# Patient Record
Sex: MALE | Race: WHITE | Employment: OTHER | URBAN - METROPOLITAN AREA
[De-identification: names, ages, dates, MRNs, and addresses within clinical notes are randomized per-mention and may not be internally consistent; named-entity substitution may affect disease eponyms.]

---

## 2021-09-10 ENCOUNTER — HOSPITAL ENCOUNTER (EMERGENCY)
Age: 31
Discharge: ANOTHER HOSPITAL | End: 2021-09-10
Attending: EMERGENCY MEDICINE
Payer: COMMERCIAL

## 2021-09-10 VITALS
RESPIRATION RATE: 18 BRPM | SYSTOLIC BLOOD PRESSURE: 109 MMHG | HEIGHT: 71 IN | OXYGEN SATURATION: 97 % | WEIGHT: 150 LBS | DIASTOLIC BLOOD PRESSURE: 70 MMHG | TEMPERATURE: 98.2 F | HEART RATE: 91 BPM | BODY MASS INDEX: 21 KG/M2

## 2021-09-10 DIAGNOSIS — T78.2XXA ANAPHYLAXIS, INITIAL ENCOUNTER: Primary | ICD-10-CM

## 2021-09-10 DIAGNOSIS — T63.481A INSECT STINGS, ACCIDENTAL OR UNINTENTIONAL, INITIAL ENCOUNTER: ICD-10-CM

## 2021-09-10 PROCEDURE — 74011250636 HC RX REV CODE- 250/636: Performed by: EMERGENCY MEDICINE

## 2021-09-10 PROCEDURE — 96374 THER/PROPH/DIAG INJ IV PUSH: CPT

## 2021-09-10 PROCEDURE — 96375 TX/PRO/DX INJ NEW DRUG ADDON: CPT

## 2021-09-10 PROCEDURE — 99284 EMERGENCY DEPT VISIT MOD MDM: CPT

## 2021-09-10 PROCEDURE — 96372 THER/PROPH/DIAG INJ SC/IM: CPT

## 2021-09-10 RX ORDER — DIPHENHYDRAMINE HCL 25 MG
25 CAPSULE ORAL
Qty: 30 CAPSULE | Refills: 0 | Status: SHIPPED | OUTPATIENT
Start: 2021-09-10 | End: 2021-09-20

## 2021-09-10 RX ORDER — EPINEPHRINE 1 MG/ML
0.3 INJECTION, SOLUTION, CONCENTRATE INTRAVENOUS
Status: COMPLETED | OUTPATIENT
Start: 2021-09-10 | End: 2021-09-10

## 2021-09-10 RX ORDER — EPINEPHRINE 0.3 MG/.3ML
0.3 INJECTION SUBCUTANEOUS
Qty: 1 EACH | Refills: 0 | Status: SHIPPED | OUTPATIENT
Start: 2021-09-10 | End: 2021-09-10

## 2021-09-10 RX ORDER — PREDNISONE 20 MG/1
40 TABLET ORAL
Qty: 10 TABLET | Refills: 0 | Status: SHIPPED | OUTPATIENT
Start: 2021-09-10 | End: 2021-09-15

## 2021-09-10 RX ADMIN — EPINEPHRINE 0.3 MG: 1 INJECTION, SOLUTION, CONCENTRATE INTRAVENOUS at 21:25

## 2021-09-10 RX ADMIN — FAMOTIDINE 20 MG: 10 INJECTION, SOLUTION INTRAVENOUS at 21:31

## 2021-09-10 RX ADMIN — METHYLPREDNISOLONE SODIUM SUCCINATE 60 MG: 125 INJECTION, POWDER, FOR SOLUTION INTRAMUSCULAR; INTRAVENOUS at 21:28

## 2021-09-11 NOTE — ED TRIAGE NOTES
Pt arrives via ems with complaint of bee sting and facial swelling. Pt took 25mg of benadryl and ems gave 50mg additional for wornsening swelling. The pt reports that he feels better at this time.

## 2021-09-11 NOTE — ED NOTES
The pt is discharged home with follow-up instructions. The pt received 3 prescriptions and verbalizes understanding of the discharge instructions.

## 2021-09-11 NOTE — ED NOTES
Patient resting quietly on stretcher with significant other at bedside. Ambulated to BR without difficulty. Will continue to monitor.

## 2021-09-11 NOTE — ED PROVIDER NOTES
ED Triage Notes  Pt arrives via ems with complaint of bee sting and facial swelling. Pt took 25mg of benadryl and ems gave 50mg additional for wornsening swelling. The pt reports that he feels better at this time. 68-year-old male brought in by EMS for allergic reaction. Patient reports that he was stung by a bee or some kind of insect when he started getting swelling of the face and difficulty swallowing. No previous history of allergic reactions. Patient took 25 mg of Benadryl and then called EMS EMS gave additional 50 mg IV Benadryl due to worsening symptoms. Patient reports mild nausea without vomiting. No abdominal pain no diarrhea. Reports some rash but more abdominally some facial swelling. Patient denies shortness of breath. Denies any tongue swelling. Patient does not take any other medications. No past medical history on file. Past Surgical History:   Procedure Laterality Date    HX OTHER SURGICAL      tubes in ears         No family history on file. Social History     Socioeconomic History    Marital status: SINGLE     Spouse name: Not on file    Number of children: Not on file    Years of education: Not on file    Highest education level: Not on file   Occupational History    Not on file   Tobacco Use    Smoking status: Current Every Day Smoker     Packs/day: 0.75     Years: 9.00     Pack years: 6.75   Substance and Sexual Activity    Alcohol use:  Yes     Alcohol/week: 2.5 standard drinks     Types: 3 Cans of beer per week     Comment: few beers a day    Drug use: Yes     Types: Marijuana    Sexual activity: Not on file   Other Topics Concern    Not on file   Social History Narrative    Not on file     Social Determinants of Health     Financial Resource Strain:     Difficulty of Paying Living Expenses:    Food Insecurity:     Worried About Running Out of Food in the Last Year:     [address] in the Last Year:    Transportation Needs:     Lack of Transportation (Medical):  Lack of Transportation (Non-Medical):    Physical Activity:     Days of Exercise per Week:     Minutes of Exercise per Session:    Stress:     Feeling of Stress :    Social Connections:     Frequency of Communication with Friends and Family:     Frequency of Social Gatherings with Friends and Family:     Attends Sabian[employer] Services:     Active Member of Clubs or Organizations:     Attends Club or Organization Meetings:     Marital Status:    Intimate Partner Violence:     Fear of Current or Ex-Partner:     Emotionally Abused:     Physically Abused:     Sexually Abused: ALLERGIES: Patient has no known allergies. Review of Systems   Constitutional: Negative for chills and fever. HENT: Positive for facial swelling, sore throat and trouble swallowing. Negative for congestion, tinnitus and voice change. Eyes: Negative for pain. Respiratory: Positive for cough and choking. Negative for shortness of breath. Cardiovascular: Negative for chest pain. Gastrointestinal: Negative for abdominal pain, diarrhea, nausea and vomiting. Genitourinary: Negative for dysuria and flank pain. Musculoskeletal: Negative for back pain and neck pain. Skin: Positive for rash. Neurological: Negative for dizziness and headaches. Vitals:    09/10/21 2142 09/10/21 2145 09/10/21 2215 09/10/21 2300   BP:  130/80 118/72 109/70   Pulse:       Resp:       Temp:       SpO2: 98% 97% 96% 97%   Weight:       Height:                Physical Exam  Constitutional:       Appearance: He is well-developed. HENT:      Head: Normocephalic. Mouth/Throat:      Lips: Pink. Mouth: Angioedema present. Tongue: No lesions. Pharynx: Pharyngeal swelling (mild) present. Comments: No tongue swelling. Mild lip and face swelling    Eyes:      Conjunctiva/sclera: Conjunctivae normal.   Cardiovascular:      Rate and Rhythm: Normal rate and regular rhythm.    Pulmonary: Effort: Pulmonary effort is normal. Prolonged expiration present. No respiratory distress. Breath sounds: Normal breath sounds. No stridor. No wheezing. Abdominal:      General: Bowel sounds are normal.      Palpations: Abdomen is soft. Tenderness: There is no abdominal tenderness. Musculoskeletal:         General: Normal range of motion. Cervical back: Normal range of motion and neck supple. Skin:     General: Skin is warm. Capillary Refill: Capillary refill takes less than 2 seconds. Findings: Rash present. Rash is urticarial.   Neurological:      Mental Status: He is alert and oriented to person, place, and time. Comments: No gross motor or sensory deficits          MDM  Number of Diagnoses or Management Options  Anaphylaxis, initial encounter  Insect stings, accidental or unintentional, initial encounter  Diagnosis management comments: Patient having symptoms consistent with anaphylaxis due to cutaneous urticaria and posterior oropharynx edema. No stridor or wheezing. Mild nausea but no diarrhea or abdominal pain. Patient received IM epinephrine as well as Solu-Medrol IV and Pepcid IV. Patient symptoms resolved. Monitor the patient in ER for 2+ hours with no recurrence of symptoms. Will give prescription for prednisone and Benadryl as needed and EpiPen as needed. Discussed the discharge impression and any labs and the results with the patient. Answered any questions and addressed any concerns. Discussed the importance of following up with their primary care provider and/or specialist.  Discussed signs or symptoms that would warrant return back to the ER for further evaluation. The patient is agreeable with discharge.     Total critical care time spent exclusive of procedures:  35min           Procedures

## 2023-05-12 RX ORDER — TRAMADOL HYDROCHLORIDE 50 MG/1
TABLET ORAL EVERY 6 HOURS PRN
COMMUNITY
Start: 2014-01-20

## 2023-05-12 RX ORDER — DIVALPROEX SODIUM 125 MG/1
CAPSULE, COATED PELLETS ORAL EVERY 12 HOURS
COMMUNITY
Start: 2011-08-19